# Patient Record
Sex: MALE | Race: WHITE | NOT HISPANIC OR LATINO
[De-identification: names, ages, dates, MRNs, and addresses within clinical notes are randomized per-mention and may not be internally consistent; named-entity substitution may affect disease eponyms.]

---

## 2021-02-04 PROBLEM — Z00.00 ENCOUNTER FOR PREVENTIVE HEALTH EXAMINATION: Status: ACTIVE | Noted: 2021-02-04

## 2021-02-05 ENCOUNTER — APPOINTMENT (OUTPATIENT)
Dept: ORTHOPEDIC SURGERY | Facility: CLINIC | Age: 31
End: 2021-02-05

## 2021-03-08 ENCOUNTER — APPOINTMENT (OUTPATIENT)
Dept: ORTHOPEDIC SURGERY | Facility: CLINIC | Age: 31
End: 2021-03-08
Payer: COMMERCIAL

## 2021-03-08 VITALS — WEIGHT: 175 LBS | BODY MASS INDEX: 26.52 KG/M2 | HEIGHT: 68 IN

## 2021-03-08 DIAGNOSIS — K46.9 UNSPECIFIED ABDOMINAL HERNIA W/OUT OBSTRUCTION OR GANGRENE: ICD-10-CM

## 2021-03-08 DIAGNOSIS — Z78.9 OTHER SPECIFIED HEALTH STATUS: ICD-10-CM

## 2021-03-08 DIAGNOSIS — Z87.09 PERSONAL HISTORY OF OTHER DISEASES OF THE RESPIRATORY SYSTEM: ICD-10-CM

## 2021-03-08 DIAGNOSIS — Z80.1 FAMILY HISTORY OF MALIGNANT NEOPLASM OF TRACHEA, BRONCHUS AND LUNG: ICD-10-CM

## 2021-03-08 PROCEDURE — 73030 X-RAY EXAM OF SHOULDER: CPT | Mod: LT

## 2021-03-08 PROCEDURE — 99072 ADDL SUPL MATRL&STAF TM PHE: CPT

## 2021-03-08 PROCEDURE — 99204 OFFICE O/P NEW MOD 45 MIN: CPT

## 2021-03-08 NOTE — PHYSICAL EXAM
[de-identified] : Constitutional\par o Appearance : well-nourished, well developed, alert, in no acute distress \par Head and Face\par o Head :\par ¦ Inspection : atraumatic, normocephalic\par Neurologic\par o Mental Status Examination :\par ¦ Orientation : alert and oriented X 3\par Psychiatric\par o Mood and Affect: mood normal, affect appropriate \par Cardiovascular\par o Observation/Palpation : - no swelling,normal pulses, normal temperature \par Lymphatic\par o Additional Nodes : No palpable lymph nodes present \par \par Cervical Spine\par o Musculoskeletal Examination : full flexion and rotation of the cervical spine. No paracervical tenderness.\par o Inspection/Palpation :\par ¦ Inspection : alignment midline, normal degree of lordosis present\par ¦ Skin : normal appearance, no masses or tenderness, trachea midline\par ¦ Palpation : musculature is nontender to palpation\par o Range of Motion : arc of motion full in all planes, no crepitance or pain with ROM\par \par \par Right Upper Extremity\par o Shoulder :\par ¦ Inspection/Palpation : no tenderness, swelling or deformities\par ¦ Range of Motion : full and painless in all planes, no crepitance\par ¦ Strength : forward elevation, internal rotation, external rotation, adduction and abduction 5/5\par ¦ Stability : no joint instability on provocative testing \par Tests: Nunez negative, Neer negative\par \par Left Upper Extremity\par o Shoulder :\par ¦ Inspection/Palpation : no tenderness, no swelling or deformities, normal sensation in the axillary patch\par ¦ Range of Motion : full forward flexion, pain with external rotation, full internal rotation, no crepitance\par ¦ Strength : forward elevation, abduction and external rotation at 90° of abduction are 5/5, but resisted external rotation at 90° of abduction causes discomfort, internal rotation, external rotation, biceps/triceps, 5/5\par ¦ Stability : no joint instability on provocative testing\par  Tests: Nunez negative, Neer negative, negative Sulcus sign, mildly positive load and shift test, positive apprehension test\par \par Gait: normal gait, no significant extremity swelling or lymphedema \par \par Radiology Results \par o Left Shoulder : AP internal/external rotation and outlet views were obtained and reveal mild sclerosis of the greater tuberosity with some calcification at the inferior aspect of the glenoid, a type II acromion.

## 2021-03-08 NOTE — HISTORY OF PRESENT ILLNESS
[de-identified] : 31 year old RHD male presents complaining of left shoulder pain and history of shoulder subluxation and dislocation. His first injury of the shoulder was about 10 years ago when he slid head first into a base while playing baseball. His shoulder slid back into place when he rolled over. Since then, he has had at least 8-10 similar injuries. The last time it just came out in his sleep. He has not been treated for it in about 5 years due to insurance issues. He was told about 5 years ago that he would need surgery for this to be resolved. He states that he has had his shoulder sublux even in his sleep or when getting bumped playing ice hockey. He usually pops the shoulder back in place by himself.

## 2021-03-08 NOTE — ADDENDUM
[FreeTextEntry1] : I, Parish Michele, acted solely as a scribe for Dr. Jarad Carlisle on this date 03/08/2021.\par All medical record entries made by the Scribe were at my, Dr. Jarad Carlisle, direction and personally dictated by me on 03/08/2021. I have reviewed the chart and agree that the record accurately reflects my personal performance of the history, physical exam, assessment and plan. I have also personally directed, reviewed, and agreed with the chart.

## 2021-03-08 NOTE — DISCUSSION/SUMMARY
[de-identified] : I discussed the underlying pathophysiology of the patient's condition in great detail with the patient. I went over the patient's x-rays with them in great detail. He should avoid positions where his shoulder is abducted and externally rotated to avoid another dislocation. He was warned to be careful when reaching behind his back to grab things. We are requesting authorization for an MRI of the patient's left shoulder. Due to his recurrent instability and dislocating more than 10 times, an MRI is necessary to evaluate the soft tissue about the shoulder. All of his questions were answered. He understands and consents to the plan. \par \par FU after a left shoulder MRI.

## 2021-03-08 NOTE — CONSULT LETTER
[Dear  ___] : Dear  [unfilled], [Consult Letter:] : I had the pleasure of evaluating your patient, [unfilled]. [Please see my note below.] : Please see my note below. [Consult Closing:] : Thank you very much for allowing me to participate in the care of this patient.  If you have any questions, please do not hesitate to contact me. [Sincerely,] : Sincerely, [FreeTextEntry3] : Jarad Carlisle M.D.

## 2021-03-11 ENCOUNTER — APPOINTMENT (OUTPATIENT)
Dept: MRI IMAGING | Facility: CLINIC | Age: 31
End: 2021-03-11
Payer: COMMERCIAL

## 2021-03-11 ENCOUNTER — OUTPATIENT (OUTPATIENT)
Dept: OUTPATIENT SERVICES | Facility: HOSPITAL | Age: 31
LOS: 1 days | End: 2021-03-11
Payer: COMMERCIAL

## 2021-03-11 DIAGNOSIS — M25.312 OTHER INSTABILITY, LEFT SHOULDER: ICD-10-CM

## 2021-03-11 DIAGNOSIS — M24.412 RECURRENT DISLOCATION, LEFT SHOULDER: ICD-10-CM

## 2021-03-11 PROCEDURE — 73221 MRI JOINT UPR EXTREM W/O DYE: CPT | Mod: 26,LT

## 2021-03-11 PROCEDURE — 73221 MRI JOINT UPR EXTREM W/O DYE: CPT

## 2021-03-15 ENCOUNTER — NON-APPOINTMENT (OUTPATIENT)
Age: 31
End: 2021-03-15

## 2021-03-17 ENCOUNTER — APPOINTMENT (OUTPATIENT)
Dept: ORTHOPEDIC SURGERY | Facility: CLINIC | Age: 31
End: 2021-03-17
Payer: COMMERCIAL

## 2021-03-17 PROCEDURE — 99214 OFFICE O/P EST MOD 30 MIN: CPT

## 2021-03-17 PROCEDURE — 99072 ADDL SUPL MATRL&STAF TM PHE: CPT

## 2021-03-17 NOTE — DISCUSSION/SUMMARY
[de-identified] : I went over the pathophysiology of the patient's symptoms in great detail with the patient. I went over the patient's MRI with them in great detail and used models to aid in my explanation. I informed the patient that surgery, an arthroscopic left shoulder stabilization surgery, will be required to provide them long term relief from their symptoms. I informed him that I no longer perform this procedure, and a referral to Dr. Shaver was provided. All of his questions were answered. He understands and consents to the plan. \par \par FU with Dr. Shaver to be evaluated for a left shoulder stabilization procedure.

## 2021-03-17 NOTE — HISTORY OF PRESENT ILLNESS
[de-identified] : 31 year old RHD male presents with left shoulder pain and history of shoulder subluxation and dislocation. His first shoulder injury was about 10 years ago when he slid head first into a base while playing baseball. His shoulder slid back into place when he rolled over. Since then, he has had at least 8-10 similar injuries. The last time it just came out in his sleep. He has not been treated for it in about 5 years due to insurance issues. He was told that he would need surgery for this to be resolved. He states that he has had his shoulder sublux even in his sleep or when getting bumped playing ice hockey. He usually pops the shoulder back in place by himself. He presents today to review his MRI results.\par \par MRI of the left shoulder obtained on 3/11/2021 revealed:\par 1. 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 Nondisplaced SLAP tear.\par 3. No rotator cuff tear.\par \par Radiology Results done 3/8/2021:\par o Left Shoulder : AP internal/external rotation and outlet views were obtained and reveal mild sclerosis of the greater tuberosity with some calcification at the inferior aspect of the glenoid, a type II acromion.

## 2021-03-17 NOTE — ADDENDUM
[FreeTextEntry1] : I, Parish Michele, acted solely as a scribe for Dr. Jarad Carlisle on this date 03/17/2021.\par All medical record entries made by the Scribe were at my, Dr. Jarad Carlisle, direction and personally dictated by me on 03/17/2021. I have reviewed the chart and agree that the record accurately reflects my personal performance of the history, physical exam, assessment and plan. I have also personally directed, reviewed, and agreed with the chart.

## 2021-03-17 NOTE — PHYSICAL EXAM
[de-identified] : Constitutional\par o Appearance : well-nourished, well developed, alert, in no acute distress \par Head and Face\par o Head :\par ¦ Inspection : atraumatic, normocephalic\par Neurologic\par o Mental Status Examination :\par ¦ Orientation : alert and oriented X 3\par Psychiatric\par o Mood and Affect: mood normal, affect appropriate \par Cardiovascular\par o Observation/Palpation : - no swelling,normal pulses, normal temperature \par Lymphatic\par o Additional Nodes : No palpable lymph nodes present \par \par Cervical Spine\par o Musculoskeletal Examination : full flexion and rotation of the cervical spine. No paracervical tenderness.\par o Inspection/Palpation :\par ¦ Inspection : alignment midline, normal degree of lordosis present\par ¦ Skin : normal appearance, no masses or tenderness, trachea midline\par ¦ Palpation : musculature is nontender to palpation\par o Range of Motion : arc of motion full in all planes, no crepitance or pain with ROM\par \par Right Upper Extremity\par o Shoulder :\par ¦ Inspection/Palpation : no tenderness, swelling or deformities\par ¦ Range of Motion : full and painless in all planes, no crepitance\par ¦ Strength : forward elevation, internal rotation, external rotation, adduction and abduction 5/5\par ¦ Stability : no joint instability on provocative testing \par o Tests: Nunez negative, Neer negative\par \par Left Upper Extremity\par o Shoulder :\par ¦ Inspection/Palpation :mild  anterior capsular  tenderness, no swelling or deformities, normal sensation in the axillary patch\par ¦ Range of Motion : full forward flexion, pain with external rotation, full internal rotation, no crepitance\par ¦ Strength : forward elevation, abduction and external rotation at 90° of abduction are 5/5, but resisted external rotation at 90° of abduction causes discomfort, internal rotation, external rotation, biceps/triceps, 5/5\par ¦ Stability : no joint instability on provocative testing\par o Tests: Nunez negative, Neer negative, negative Sulcus sign, mildly positive load and shift test, positive apprehension test\par \par Gait: normal gait, no significant extremity swelling or lymphedema

## 2021-03-19 ENCOUNTER — APPOINTMENT (OUTPATIENT)
Dept: ORTHOPEDIC SURGERY | Facility: CLINIC | Age: 31
End: 2021-03-19
Payer: COMMERCIAL

## 2021-03-23 ENCOUNTER — APPOINTMENT (OUTPATIENT)
Dept: ORTHOPEDIC SURGERY | Facility: CLINIC | Age: 31
End: 2021-03-23
Payer: COMMERCIAL

## 2021-03-23 PROCEDURE — 99214 OFFICE O/P EST MOD 30 MIN: CPT

## 2021-03-23 PROCEDURE — 99072 ADDL SUPL MATRL&STAF TM PHE: CPT

## 2021-03-23 NOTE — DISCUSSION/SUMMARY
[de-identified] : The underlying pathophysiology was reviewed in great detail with the patient as well as the various treatment options, including ice, analgesics, NSAIDs, Physical therapy, steroid injections, surgical intervention \par \par MRI of the left shoulder was reviewed and discussed in great detail today. \par \par The patient wishes to proceed with SURGICAL INTERVENTION at this time. The risks and benefits of a LEFT BANKART REPAIR WITH LABRAL REPAIR  AND POSSIBLE REMPLISSAGE were discussed in great detail today, including but not limited to bleeding, infection, nerve injury, DVT, allergy to the anesthetic or to the implants, persistent pain, stiffness, scarring, swelling or deformity.\par \par I am prescribing this mechanical DVT Prophylaxis as an alternative/ addition to pharmacological anticoagulants. I consider this form of mechanical prophylaxis to be an equally effective protocol in the post-operative prevention of DVT and PE without excessive bleeding, other potential risk factors and contraindications. I am prescribing this mechanical prophylaxis as in order to help with localized edema and to improve circulation.  This desired mechanical prophylaxis will also benefit the patient if hypertension is present.\par \par I am prescribing the use of NMES (neuromuscular electrical stimulator, Manaflexx 2) to aid in disuse atrophy. The device is to be used for at-home treatment following surgery to help stimulate the surrounding muscles and prevent atrophy that would otherwise occur due to lack of use.\par  \par \par Activity modifications and restrictions were discussed. \par \par \par All questions were answered, all alternatives discussed and the patient is in complete agreement with that plan. Follow-up appointment as instructed. Any issues and the patient will call or come in sooner.

## 2021-03-23 NOTE — HISTORY OF PRESENT ILLNESS
[de-identified] : 31 year old RHD male presents with left shoulder pain and history of shoulder subluxation and dislocation. His first shoulder injury was about 10 years ago when he slid head first into a base while playing baseball. His shoulder slid back into place when he rolled over. Since then, he has had at least 8-10 similar injuries. The last time it just came out in his sleep. He has not been treated for it in about 5 years due to insurance issues. He was told that he would need surgery for this to be resolved. He states that he has had his shoulder sublux even in his sleep or when getting bumped playing ice hockey. He usually pops the shoulder back in place by himself. Patient was referred by Dr. Carlisle for surgical consultation. Patient has an MRI of the right shoulder from [facility].

## 2021-03-23 NOTE — PHYSICAL EXAM
[de-identified] : Right Upper Extremity\par o Shoulder :\par ¦ Inspection/Palpation : no tenderness over the greater tuberosity, no acromioclavicular joint tenderness, no tenderness anterior and posterior glenohumeral joint,no swelling, no deformities\par ¦ Range of Motion : Full and painless range of motion, no crepitance \par ¦ Strength : external rotation 5/5, internal rotation 5/5, supraspinatus 5/5\par ¦ Stability : no joint instability on provocative testing\par ¦ Tests/Signs : Neer (-), Nunez (-)\par o Upper Arm : no tenderness, no swelling, no deformities\par o Muscle Bulk : no atrophy\par o Sensation : sensation intact to light touch\par o Skin : no skin rash or discoloration\par o Vascular Exam : no edema, no cyanosis, radial and ulnar pulses normal\par \par Left Upper Extremity\par o Shoulder :\par ¦ Inspection/Palpation : mild anterior capsular tenderness to palpation, no tenderness over the greater tuberosity, no acromioclavicular joint tenderness, no tenderness posterior glenohumeral joint,no swelling, no deformities\par ¦ Range of Motion : ACTIVE FORWARD ELEVATION: Measured at 160 degrees, ACTIVE EXTERNAL ROTATION: Measured at 85 degrees, ACTIVE INTERNAL ROTATION: Measured at T8\par ¦ Strength : external rotation 5/5, internal rotation 5/5, supraspinatus 5/5\par ¦ Stability : no joint instability on provocative testing\par ¦ Tests/Signs : Neer (-), Nunez (-), Apprehension (+), Anterior Load and Shift (2+), Sulcus Sign (-) \par o Upper Arm : no tenderness, no swelling, no deformities\par o Muscle Bulk : no atrophy\par o Sensation : sensation intact to light touch\par o Skin : no skin rash or discoloration\par o Vascular Exam : no edema, no cyanosis, radial and ulnar pulses normal [de-identified] : Patient comes to today's visit with outside imaging already performed. I reviewed the images in detail with the patient and discussed the findings as highlighted below. \par MRI of the left shoulder obtained on 3/11/2021 revealed:\par 1. 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 Nondisplaced SLAP tear.\par 3. No rotator cuff tear.\par \par Radiology Results done 3/8/2021 in office with Dr. Carlisle:\par o Left Shoulder : AP internal/external rotation and outlet views were obtained and reveal mild sclerosis of the greater tuberosity with some calcification at the inferior aspect of the glenoid, a type II acromion.

## 2021-04-01 ENCOUNTER — OUTPATIENT (OUTPATIENT)
Dept: OUTPATIENT SERVICES | Facility: HOSPITAL | Age: 31
LOS: 1 days | End: 2021-04-01
Payer: COMMERCIAL

## 2021-04-01 VITALS
SYSTOLIC BLOOD PRESSURE: 127 MMHG | TEMPERATURE: 98 F | WEIGHT: 180.78 LBS | DIASTOLIC BLOOD PRESSURE: 87 MMHG | HEART RATE: 67 BPM | RESPIRATION RATE: 12 BRPM | OXYGEN SATURATION: 99 % | HEIGHT: 68 IN

## 2021-04-01 DIAGNOSIS — M25.312 OTHER INSTABILITY, LEFT SHOULDER: ICD-10-CM

## 2021-04-01 DIAGNOSIS — M25.512 PAIN IN LEFT SHOULDER: ICD-10-CM

## 2021-04-01 DIAGNOSIS — Z01.818 ENCOUNTER FOR OTHER PREPROCEDURAL EXAMINATION: ICD-10-CM

## 2021-04-01 PROCEDURE — G0463: CPT

## 2021-04-01 NOTE — H&P PST ADULT - MUSCULOSKELETAL
details… detailed exam no joint swelling/no joint erythema/no joint warmth/no calf tenderness/decreased ROM/decreased ROM due to pain/diminished strength

## 2021-04-01 NOTE — H&P PST ADULT - NSICDXPASTMEDICALHX_GEN_ALL_CORE_FT
PAST MEDICAL HISTORY:  Environmental allergies     Exercise-induced asthma no inhalers    Left shoulder pain     Migraines on occasion

## 2021-04-01 NOTE — H&P PST ADULT - NSANTHOSAYNRD_GEN_A_CORE
neck 15.5 inches/No. TI screening performed.  STOP BANG Legend: 0-2 = LOW Risk; 3-4 = INTERMEDIATE Risk; 5-8 = HIGH Risk

## 2021-04-01 NOTE — H&P PST ADULT - NSICDXPASTSURGICALHX_GEN_ALL_CORE_FT
PAST SURGICAL HISTORY:  H/O inguinal hernia repair bilateral 2011    H/O local excision of skin lesion scalp 2001, benign    History of excision of mass age 1 left neck, benign

## 2021-04-01 NOTE — H&P PST ADULT - NSICDXPROBLEM_GEN_ALL_CORE_FT
PROBLEM DIAGNOSES  Problem: Left shoulder pain  Assessment and Plan: left shoulder arthroscopic bankart repair. instructed to stop ibuprofen 1 week prior to surgery. surgical wash instructions reviewed and verbalized understanding. covid PCR appt confirmed for 4/12/21 at 12:30

## 2021-04-01 NOTE — H&P PST ADULT - HISTORY OF PRESENT ILLNESS
32 yo male presents with 10 year history of left shoulder pain playing baseball and sliding into first base and hitting the fielder. He has had several other injuries to the shoulder playing sports and during training for the [employer]. He is a  and did stretching exercises himself. No injections into the shoulder. Currently reports 2-4/10 pain at rest and increased to 6-8/10 with activity. Pain mildly relieved with ibuprofen. Reports also limited lateral mobility.

## 2021-04-12 ENCOUNTER — OUTPATIENT (OUTPATIENT)
Dept: OUTPATIENT SERVICES | Facility: HOSPITAL | Age: 31
LOS: 1 days | End: 2021-04-12
Payer: COMMERCIAL

## 2021-04-12 DIAGNOSIS — Z20.828 CONTACT WITH AND (SUSPECTED) EXPOSURE TO OTHER VIRAL COMMUNICABLE DISEASES: ICD-10-CM

## 2021-04-12 LAB — SARS-COV-2 RNA SPEC QL NAA+PROBE: SIGNIFICANT CHANGE UP

## 2021-04-12 PROCEDURE — U0003: CPT

## 2021-04-12 PROCEDURE — U0005: CPT

## 2021-04-13 ENCOUNTER — TRANSCRIPTION ENCOUNTER (OUTPATIENT)
Age: 31
End: 2021-04-13

## 2021-04-14 ENCOUNTER — RESULT REVIEW (OUTPATIENT)
Age: 31
End: 2021-04-14

## 2021-04-14 ENCOUNTER — OUTPATIENT (OUTPATIENT)
Dept: OUTPATIENT SERVICES | Facility: HOSPITAL | Age: 31
LOS: 1 days | End: 2021-04-14
Payer: COMMERCIAL

## 2021-04-14 ENCOUNTER — APPOINTMENT (OUTPATIENT)
Dept: ORTHOPEDIC SURGERY | Facility: HOSPITAL | Age: 31
End: 2021-04-14

## 2021-04-14 VITALS
RESPIRATION RATE: 16 BRPM | OXYGEN SATURATION: 99 % | DIASTOLIC BLOOD PRESSURE: 76 MMHG | SYSTOLIC BLOOD PRESSURE: 157 MMHG | HEART RATE: 87 BPM

## 2021-04-14 VITALS
DIASTOLIC BLOOD PRESSURE: 84 MMHG | WEIGHT: 178.57 LBS | HEART RATE: 84 BPM | SYSTOLIC BLOOD PRESSURE: 135 MMHG | RESPIRATION RATE: 10 BRPM | OXYGEN SATURATION: 99 % | TEMPERATURE: 98 F | HEIGHT: 67 IN

## 2021-04-14 DIAGNOSIS — M21.822 OTHER SPECIFIED ACQUIRED DEFORMITIES OF LEFT UPPER ARM: ICD-10-CM

## 2021-04-14 DIAGNOSIS — Z01.818 ENCOUNTER FOR OTHER PREPROCEDURAL EXAMINATION: ICD-10-CM

## 2021-04-14 DIAGNOSIS — M25.312 OTHER INSTABILITY, LEFT SHOULDER: ICD-10-CM

## 2021-04-14 DIAGNOSIS — S43.492A OTHER SPRAIN OF LEFT SHOULDER JOINT, INITIAL ENCOUNTER: ICD-10-CM

## 2021-04-14 PROCEDURE — 88304 TISSUE EXAM BY PATHOLOGIST: CPT | Mod: 26

## 2021-04-14 PROCEDURE — 88304 TISSUE EXAM BY PATHOLOGIST: CPT

## 2021-04-14 PROCEDURE — 29806 SHO ARTHRS SRG CAPSULORRAPHY: CPT | Mod: 22,LT

## 2021-04-14 PROCEDURE — C1713: CPT

## 2021-04-14 PROCEDURE — 29806 SHO ARTHRS SRG CAPSULORRAPHY: CPT | Mod: LT

## 2021-04-14 PROCEDURE — C1889: CPT

## 2021-04-14 RX ORDER — APREPITANT 80 MG/1
40 CAPSULE ORAL ONCE
Refills: 0 | Status: COMPLETED | OUTPATIENT
Start: 2021-04-14 | End: 2021-04-14

## 2021-04-14 RX ORDER — ACETAMINOPHEN 500 MG
1000 TABLET ORAL ONCE
Refills: 0 | Status: COMPLETED | OUTPATIENT
Start: 2021-04-14 | End: 2021-04-14

## 2021-04-14 RX ORDER — OXYCODONE AND ACETAMINOPHEN 5; 325 MG/1; MG/1
5-325 TABLET ORAL EVERY 4 HOURS
Qty: 42 | Refills: 0 | Status: ACTIVE | COMMUNITY
Start: 2021-04-14 | End: 1900-01-01

## 2021-04-14 RX ORDER — ONDANSETRON 8 MG/1
4 TABLET, FILM COATED ORAL ONCE
Refills: 0 | Status: DISCONTINUED | OUTPATIENT
Start: 2021-04-14 | End: 2021-04-14

## 2021-04-14 RX ORDER — CHLORHEXIDINE GLUCONATE 213 G/1000ML
1 SOLUTION TOPICAL ONCE
Refills: 0 | Status: COMPLETED | OUTPATIENT
Start: 2021-04-14 | End: 2021-04-14

## 2021-04-14 RX ORDER — IBUPROFEN 200 MG
2 TABLET ORAL
Qty: 0 | Refills: 0 | DISCHARGE

## 2021-04-14 RX ORDER — HYDROMORPHONE HYDROCHLORIDE 2 MG/ML
0.5 INJECTION INTRAMUSCULAR; INTRAVENOUS; SUBCUTANEOUS
Refills: 0 | Status: DISCONTINUED | OUTPATIENT
Start: 2021-04-14 | End: 2021-04-14

## 2021-04-14 RX ORDER — CEFAZOLIN SODIUM 1 G
2000 VIAL (EA) INJECTION ONCE
Refills: 0 | Status: COMPLETED | OUTPATIENT
Start: 2021-04-14 | End: 2021-04-14

## 2021-04-14 RX ORDER — SODIUM CHLORIDE 9 MG/ML
1000 INJECTION, SOLUTION INTRAVENOUS
Refills: 0 | Status: DISCONTINUED | OUTPATIENT
Start: 2021-04-14 | End: 2021-04-14

## 2021-04-14 RX ADMIN — HYDROMORPHONE HYDROCHLORIDE 0.5 MILLIGRAM(S): 2 INJECTION INTRAMUSCULAR; INTRAVENOUS; SUBCUTANEOUS at 13:50

## 2021-04-14 RX ADMIN — HYDROMORPHONE HYDROCHLORIDE 0.5 MILLIGRAM(S): 2 INJECTION INTRAMUSCULAR; INTRAVENOUS; SUBCUTANEOUS at 16:02

## 2021-04-14 RX ADMIN — Medication 1000 MILLIGRAM(S): at 13:51

## 2021-04-14 RX ADMIN — APREPITANT 40 MILLIGRAM(S): 80 CAPSULE ORAL at 09:44

## 2021-04-14 RX ADMIN — Medication 400 MILLIGRAM(S): at 13:23

## 2021-04-14 RX ADMIN — HYDROMORPHONE HYDROCHLORIDE 0.5 MILLIGRAM(S): 2 INJECTION INTRAMUSCULAR; INTRAVENOUS; SUBCUTANEOUS at 13:51

## 2021-04-14 RX ADMIN — HYDROMORPHONE HYDROCHLORIDE 0.5 MILLIGRAM(S): 2 INJECTION INTRAMUSCULAR; INTRAVENOUS; SUBCUTANEOUS at 13:23

## 2021-04-14 RX ADMIN — CHLORHEXIDINE GLUCONATE 1 APPLICATION(S): 213 SOLUTION TOPICAL at 09:44

## 2021-04-14 RX ADMIN — SODIUM CHLORIDE 100 MILLILITER(S): 9 INJECTION, SOLUTION INTRAVENOUS at 13:33

## 2021-04-14 NOTE — ASU DISCHARGE PLAN (ADULT/PEDIATRIC) - ASU DC SPECIAL INSTRUCTIONSFT
FU in office in 7-10 days for suture removal.  May remove large bandage in 2 days and cover sutures with band aids.   DO NOT REMOVE STERI STRIPS  REMAIN IN SLING AT ALL TIMES   pain medication sent to pharmacy.

## 2021-04-14 NOTE — ASU DISCHARGE PLAN (ADULT/PEDIATRIC) - CARE PROVIDER_API CALL
Santi Shaver)  Orthopaedic Sports Medicine; Orthopaedic Surgery  [address]  [place] [address]  Phone: [phone]  Fax: [phone]  Established Patient  Follow Up Time:

## 2021-04-14 NOTE — ASU PATIENT PROFILE, ADULT - PMH
Environmental allergies    Exercise-induced asthma  no inhalers  Left shoulder pain    Migraines  on occasion

## 2021-04-14 NOTE — BRIEF OPERATIVE NOTE - NSICDXBRIEFPREOP_GEN_ALL_CORE_FT
PRE-OP DIAGNOSIS:  Bankart lesion of left shoulder 14-Apr-2021 13:01:37  Madeleine Barton  Hill Sachs deformity, left 14-Apr-2021 13:01:47  Madeleine Barton

## 2021-04-14 NOTE — BRIEF OPERATIVE NOTE - NSICDXBRIEFPOSTOP_GEN_ALL_CORE_FT
POST-OP DIAGNOSIS:  Hill Sachs deformity, left 14-Apr-2021 13:01:58  Madeleine Barton  Bankart lesion of left shoulder 14-Apr-2021 13:02:09  Madeleine Barton

## 2021-04-14 NOTE — ASU PATIENT PROFILE, ADULT - PSH
H/O inguinal hernia repair  bilateral 2011  H/O local excision of skin lesion  scalp 2001, benign  History of excision of mass  age 1 left neck, benign

## 2021-04-16 LAB — SURGICAL PATHOLOGY STUDY: SIGNIFICANT CHANGE UP

## 2021-04-20 ENCOUNTER — APPOINTMENT (OUTPATIENT)
Dept: ORTHOPEDIC SURGERY | Facility: CLINIC | Age: 31
End: 2021-04-20
Payer: COMMERCIAL

## 2021-04-20 ENCOUNTER — NON-APPOINTMENT (OUTPATIENT)
Age: 31
End: 2021-04-20

## 2021-04-20 PROCEDURE — 99024 POSTOP FOLLOW-UP VISIT: CPT

## 2021-04-20 NOTE — HISTORY OF PRESENT ILLNESS
[Doing Well] : is doing well [Adequate Pain Control] : has adequate pain control [de-identified] : s/p Left shoulder arthroscopy with bony Bankart repair and remplissage procedure on 04/14/2021 [de-identified] : Left Upper Extremity\par o Shoulder :\par ¦ Inspection/Palpation :mild diffuse tenderness to palpation, mild ecchymosis around anterior portal sites, mild diffuse swelling, no deformities, incisions clean, dry and intact, nonerythematous, no discharge or dehiscence. \par ¦ Range of Motion : not assessed today due to post operative status. \par ¦ Strength : not assessed today due to post operative status. \par ¦ Stability : no joint instability on provocative testing\par o Upper Arm : no tenderness, no swelling, no deformities\par o Muscle Bulk : no atrophy\par o Sensation : sensation intact to light touch\par o Skin : no skin rash or discoloration\par o Vascular Exam : no edema, no cyanosis, radial and ulnar pulses normal [de-identified] : 31 year male presents for a post operative evaluation s/p Left shoulder arthroscopy with bony Bankart repair and remplissage procedure on 04/14/2021 Patient states he is feeling good post operatively. Patient presents in an abduction sling. Patient  notes mild to moderate pain during the day and increased pain at night. He reports difficulty sleeping due to the pain. \par Patient denies fever, chills, nausea or vomiting. Patient is here today for wound check suture and clinical evaluation  [de-identified] : Arthroscopy images reviewed in great detail with patient.\par \par Sutures were removed and Steri-Strips were placed. He was instructed to allow the Steri-Strips to fall off on their own.\par \par Activity modifications and restrictions were discussed.\par \par Remain in sling at all times.\par \par A prescription for Physical Therapy was provided.\par \par  A protocol was provided today. \par \par FU 4 weeks. \par \par All questions were answered, all alternatives discussed and the patient is in complete agreement with that plan. Follow-up appointment as instructed. Any issues and the patient will call or come in sooner.

## 2021-05-06 ENCOUNTER — RX RENEWAL (OUTPATIENT)
Age: 31
End: 2021-05-06

## 2021-05-06 RX ORDER — NAPROXEN 500 MG/1
500 TABLET ORAL
Qty: 60 | Refills: 0 | Status: ACTIVE | COMMUNITY
Start: 2021-04-14 | End: 1900-01-01

## 2021-05-18 ENCOUNTER — APPOINTMENT (OUTPATIENT)
Dept: ORTHOPEDIC SURGERY | Facility: CLINIC | Age: 31
End: 2021-05-18
Payer: COMMERCIAL

## 2021-05-18 PROCEDURE — 99024 POSTOP FOLLOW-UP VISIT: CPT

## 2021-06-29 ENCOUNTER — APPOINTMENT (OUTPATIENT)
Dept: ORTHOPEDIC SURGERY | Facility: CLINIC | Age: 31
End: 2021-06-29
Payer: COMMERCIAL

## 2021-06-29 DIAGNOSIS — M24.412 RECURRENT DISLOCATION, LEFT SHOULDER: ICD-10-CM

## 2021-06-29 DIAGNOSIS — M25.312 OTHER INSTABILITY, LEFT SHOULDER: ICD-10-CM

## 2021-06-29 PROCEDURE — 99024 POSTOP FOLLOW-UP VISIT: CPT

## 2021-06-29 NOTE — HISTORY OF PRESENT ILLNESS
[Doing Well] : is doing well [Adequate Pain Control] : has adequate pain control [de-identified] : s/p Left shoulder arthroscopy with bony Bankart repair and remplissage procedure on 04/14/2021 [de-identified] : 31 year male presents for a post operative evaluation s/p Left shoulder arthroscopy with bony Bankart repair and remplissage procedure on 04/14/2021 Patient states he is feeling good post operatively. He  has been attending physical therapy noting improvements in strength and range of motion He notes  minimal pain overall, though he experiences the occasional sharp pain reminding him to take it easy. \par Patient denies fever, chills, nausea or vomiting. Patient is here today for wound check and clinical evaluation  [de-identified] : Left Upper Extremity\par o Shoulder :\par ¦ Inspection/Palpation : greater tuberosity tenderness to palpation, no swelling or discoloration,  no deformities,incisions well healed. \par ¦ Range of Motion : ACTIVE FORWARD ELEVATION: Measured at 140 degrees, ACTIVE EXTERNAL ROTATION: Measured at 25 degrees ACTIVE INTERNAL ROTATION: Measured at T12, ACTIVE ABDUCTION: Measured at 150 degrees \par ¦ Strength : supraspinatus 5/5, external rotation 5/5, internal rotation 5/5 \par ¦ Stability : no joint instability on provocative testing\par o Upper Arm : no tenderness, no swelling, no deformities\par o Muscle Bulk : no atrophy\par o Sensation : sensation intact to light touch\par o Skin : no skin rash or discoloration\par o Vascular Exam : no edema, no cyanosis, radial and ulnar pulses normal [de-identified] : Arthroscopy images reviewed in great detail with patient.\par \par Activity modifications and restrictions were discussed.\par \par Continue physical therapy as prescribed. A prescription for Physical Therapy was provided.\par \par Activity modifications and restrictions were discussed. I advised avoiding overhead lifting. I advised the patient to work on good posture. \par \par FU 6 weeks. \par \par All questions were answered, all alternatives discussed and the patient is in complete agreement with that plan. Follow-up appointment as instructed. Any issues and the patient will call or come in sooner.

## 2021-08-30 NOTE — DISCUSSION/SUMMARY
[de-identified] : Arthroscopy images reviewed in great detail with patient.\par \par Activity modifications and restrictions were discussed.\par \par Continue physical therapy as prescribed. A prescription for Physical Therapy was provided.\par \par Activity modifications and restrictions were discussed. I advised avoiding overhead lifting. I advised the patient to work on good posture. \par \par FU 6 weeks. \par \par All questions were answered, all alternatives discussed and the patient is in complete agreement with that plan. Follow-up appointment as instructed. Any issues and the patient will call or come in sooner.

## 2021-08-30 NOTE — REASON FOR VISIT
[Follow-Up Visit] : a follow-up visit for [Aftercare Following Surgery] : aftercare following surgery [FreeTextEntry2] : s/p Left shoulder arthroscopy with bony Bankart repair and remplissage procedure on 04/14/2021.

## 2021-08-30 NOTE — HISTORY OF PRESENT ILLNESS
[de-identified] : 31 year male presents for a post operative evaluation s/p Left shoulder arthroscopy with bony Bankart repair and remplissage procedure on 04/14/2021 Patient states he is feeling good post operatively. He has been attending physical therapy noting improvements in strength and range of motion He notes minimal pain overall, though he experiences the occasional sharp pain reminding him to take it easy. \par Patient denies fever, chills, nausea or vomiting. Patient is here today for wound check and clinical evaluation.

## 2021-08-30 NOTE — PHYSICAL EXAM
[de-identified] : Left Upper Extremity\par o Shoulder :\par ¦ Inspection/Palpation : greater tuberosity tenderness to palpation, no swelling or discoloration, no deformities,incisions well healed. \par ¦ Range of Motion : ACTIVE FORWARD ELEVATION: Measured at 140 degrees, ACTIVE EXTERNAL ROTATION: Measured at 25 degrees ACTIVE INTERNAL ROTATION: Measured at T12, ACTIVE ABDUCTION: Measured at 150 degrees \par ¦ Strength : supraspinatus 5/5, external rotation 5/5, internal rotation 5/5 \par ¦ Stability : no joint instability on provocative testing\par o Upper Arm : no tenderness, no swelling, no deformities\par o Muscle Bulk : no atrophy\par o Sensation : sensation intact to light touch\par o Skin : no skin rash or discoloration\par o Vascular Exam : no edema, no cyanosis, radial and ulnar pulses normal. \par

## 2021-08-31 ENCOUNTER — APPOINTMENT (OUTPATIENT)
Dept: ORTHOPEDIC SURGERY | Facility: CLINIC | Age: 31
End: 2021-08-31

## 2022-08-31 NOTE — HISTORY OF PRESENT ILLNESS
[Doing Well] : is doing well [Adequate Pain Control] : has adequate pain control [de-identified] : s/p Left shoulder arthroscopy with bony Bankart repair and remplissage procedure on 04/14/2021 Detail Level: Detailed [de-identified] : 31 year male presents for a post operative evaluation s/p Left shoulder arthroscopy with bony Bankart repair and remplissage procedure on 04/14/2021 Patient states he is feeling good post operatively. Patient presents in a sling. Patient  notes mild to moderate pain during the day and increased pain at night that has been decreasing overtime. He  has been attending physical therapy noting improvements in strength and range of motion \par Patient denies fever, chills, nausea or vomiting. Patient is here today for wound check and clinical evaluation  Quality 226: Preventive Care And Screening: Tobacco Use: Screening And Cessation Intervention: Patient screened for tobacco use and is an ex/non-smoker [de-identified] : Left Upper Extremity\par o Shoulder :\par ¦ Inspection/Palpation :mild diffuse tenderness to palpation, no swelling or discoloration,  no deformities,incisions well healed. \par ¦ Range of Motion : ACTIVE FORWARD ELEVATION: Measured at 90 degrees, ACTIVE EXTERNAL ROTATION: Measured at- neutral ACTIVE INTERNAL ROTATION: Measured at L5 \par ¦ Strength : not assessed today due to post operative status. \par ¦ Stability : no joint instability on provocative testing\par o Upper Arm : no tenderness, no swelling, no deformities\par o Muscle Bulk : no atrophy\par o Sensation : sensation intact to light touch\par o Skin : no skin rash or discoloration\par o Vascular Exam : no edema, no cyanosis, radial and ulnar pulses normal Quality 47: Advance Care Plan: Advance care planning not documented, reason not otherwise specified. [de-identified] : Arthroscopy images reviewed in great detail with patient.\par \par Activity modifications and restrictions were discussed.\par \par May discontinue use of sling at this time. \par \par Continue physical therapy as prescribed. A prescription for Physical Therapy was provided.\par \par  A protocol was provided today. at his last visit.\par \par FU 6 weeks. \par \par All questions were answered, all alternatives discussed and the patient is in complete agreement with that plan. Follow-up appointment as instructed. Any issues and the patient will call or come in sooner.  Quality 431: Preventive Care And Screening: Unhealthy Alcohol Use - Screening: Patient not identified as an unhealthy alcohol user when screened for unhealthy alcohol use using a systematic screening method Quality 110: Preventive Care And Screening: Influenza Immunization: Influenza Immunization Administered during Influenza season

## 2023-12-11 NOTE — H&P PST ADULT - NS PRO ABUSE SCREEN SUSPICION NEGLECT YN
Last apt: 07-   Next apt: 01-     Hydrocod/acetam 10-326mg tab is out of stock. Requesting to send to alternate pharmacy.    no